# Patient Record
Sex: MALE | Race: WHITE | Employment: OTHER | URBAN - METROPOLITAN AREA
[De-identification: names, ages, dates, MRNs, and addresses within clinical notes are randomized per-mention and may not be internally consistent; named-entity substitution may affect disease eponyms.]

---

## 2017-02-20 PROBLEM — R79.89 LOW TESTOSTERONE LEVEL IN MALE: Status: ACTIVE | Noted: 2017-02-20

## 2017-02-20 PROCEDURE — 81003 URINALYSIS AUTO W/O SCOPE: CPT | Performed by: INTERNAL MEDICINE

## 2017-02-20 PROCEDURE — 84402 ASSAY OF FREE TESTOSTERONE: CPT | Performed by: INTERNAL MEDICINE

## 2017-02-20 PROCEDURE — 84403 ASSAY OF TOTAL TESTOSTERONE: CPT | Performed by: INTERNAL MEDICINE

## 2017-03-01 PROBLEM — Z85.850 HISTORY OF THYROID CANCER: Status: ACTIVE | Noted: 2017-03-01

## 2017-04-28 PROCEDURE — 86800 THYROGLOBULIN ANTIBODY: CPT | Performed by: INTERNAL MEDICINE

## 2017-04-28 PROCEDURE — 84432 ASSAY OF THYROGLOBULIN: CPT | Performed by: INTERNAL MEDICINE

## 2018-02-26 PROCEDURE — 86800 THYROGLOBULIN ANTIBODY: CPT | Performed by: INTERNAL MEDICINE

## 2018-02-26 PROCEDURE — 84432 ASSAY OF THYROGLOBULIN: CPT | Performed by: INTERNAL MEDICINE

## 2018-03-20 PROCEDURE — 84403 ASSAY OF TOTAL TESTOSTERONE: CPT | Performed by: INTERNAL MEDICINE

## 2018-03-20 PROCEDURE — 84402 ASSAY OF FREE TESTOSTERONE: CPT | Performed by: INTERNAL MEDICINE

## 2018-03-26 PROCEDURE — 81003 URINALYSIS AUTO W/O SCOPE: CPT | Performed by: INTERNAL MEDICINE

## 2018-03-30 PROBLEM — J84.10 PULMONARY FIBROSIS (HCC): Status: ACTIVE | Noted: 2018-03-30

## 2018-10-22 PROCEDURE — 84402 ASSAY OF FREE TESTOSTERONE: CPT | Performed by: INTERNAL MEDICINE

## 2018-10-22 PROCEDURE — 84403 ASSAY OF TOTAL TESTOSTERONE: CPT | Performed by: INTERNAL MEDICINE

## 2019-03-22 PROCEDURE — 81003 URINALYSIS AUTO W/O SCOPE: CPT | Performed by: INTERNAL MEDICINE

## 2019-03-28 PROBLEM — J84.10 PULMONARY FIBROSIS (HCC): Status: RESOLVED | Noted: 2018-03-30 | Resolved: 2019-03-28

## 2019-04-02 PROCEDURE — 86800 THYROGLOBULIN ANTIBODY: CPT | Performed by: INTERNAL MEDICINE

## 2019-04-02 PROCEDURE — 84432 ASSAY OF THYROGLOBULIN: CPT | Performed by: INTERNAL MEDICINE

## 2019-04-02 PROCEDURE — 36415 COLL VENOUS BLD VENIPUNCTURE: CPT | Performed by: INTERNAL MEDICINE

## 2019-04-10 PROCEDURE — 86800 THYROGLOBULIN ANTIBODY: CPT | Performed by: INTERNAL MEDICINE

## 2019-04-10 PROCEDURE — 84432 ASSAY OF THYROGLOBULIN: CPT | Performed by: INTERNAL MEDICINE

## 2020-09-11 PROBLEM — I50.21 ACUTE SYSTOLIC CONGESTIVE HEART FAILURE (HCC): Status: ACTIVE | Noted: 2020-09-11

## 2020-10-05 PROBLEM — R60.0 BILATERAL LEG EDEMA: Status: ACTIVE | Noted: 2020-10-05

## 2020-10-05 PROBLEM — I50.21 ACUTE SYSTOLIC CONGESTIVE HEART FAILURE (HCC): Status: RESOLVED | Noted: 2020-09-11 | Resolved: 2020-10-05

## 2020-10-05 PROBLEM — Z78.9 POOR CONDITIONING: Status: ACTIVE | Noted: 2020-10-05

## 2020-11-17 PROBLEM — R09.02 HYPOXIA: Status: ACTIVE | Noted: 2020-11-17

## 2020-11-17 PROBLEM — L03.032 PARONYCHIA OF GREAT TOE OF LEFT FOOT: Status: ACTIVE | Noted: 2020-11-17

## 2020-11-17 PROBLEM — U07.1 COVID-19 WITH MULTIPLE COMORBIDITIES: Status: ACTIVE | Noted: 2020-11-17

## 2021-04-01 PROBLEM — R09.02 HYPOXIA: Status: RESOLVED | Noted: 2020-11-17 | Resolved: 2021-04-01

## 2021-04-01 PROBLEM — L03.032 PARONYCHIA OF GREAT TOE OF LEFT FOOT: Status: RESOLVED | Noted: 2020-11-17 | Resolved: 2021-04-01

## 2021-04-13 PROBLEM — A49.9 UTI (URINARY TRACT INFECTION), BACTERIAL: Status: ACTIVE | Noted: 2021-04-13

## 2021-04-13 PROBLEM — N39.0 UTI (URINARY TRACT INFECTION), BACTERIAL: Status: ACTIVE | Noted: 2021-04-13

## 2021-05-04 ENCOUNTER — HOSPITAL ENCOUNTER (OUTPATIENT)
Facility: HOSPITAL | Age: 81
Setting detail: OBSERVATION
Discharge: HOME | End: 2021-05-06
Attending: INTERNAL MEDICINE | Admitting: INTERNAL MEDICINE
Payer: MEDICARE

## 2021-05-04 PROBLEM — J18.9 PNA (PNEUMONIA): Status: ACTIVE | Noted: 2021-05-04

## 2021-05-04 PROCEDURE — 84550 ASSAY OF BLOOD/URIC ACID: CPT | Performed by: INTERNAL MEDICINE

## 2021-05-04 RX ORDER — ASPIRIN 81 MG/1
81 TABLET ORAL DAILY
Status: DISCONTINUED | OUTPATIENT
Start: 2021-05-04 | End: 2021-05-06

## 2021-05-04 RX ORDER — TAMSULOSIN HYDROCHLORIDE 0.4 MG/1
0.4 CAPSULE ORAL DAILY
Status: DISCONTINUED | OUTPATIENT
Start: 2021-05-04 | End: 2021-05-06

## 2021-05-04 RX ORDER — METOCLOPRAMIDE HYDROCHLORIDE 5 MG/ML
10 INJECTION INTRAMUSCULAR; INTRAVENOUS EVERY 8 HOURS PRN
Status: DISCONTINUED | OUTPATIENT
Start: 2021-05-04 | End: 2021-05-06

## 2021-05-04 RX ORDER — LEVOFLOXACIN 5 MG/ML
750 INJECTION, SOLUTION INTRAVENOUS EVERY 24 HOURS
Status: DISCONTINUED | OUTPATIENT
Start: 2021-05-04 | End: 2021-05-05

## 2021-05-04 RX ORDER — ACETAMINOPHEN 325 MG/1
650 TABLET ORAL EVERY 6 HOURS PRN
Status: DISCONTINUED | OUTPATIENT
Start: 2021-05-04 | End: 2021-05-06

## 2021-05-04 RX ORDER — ALBUTEROL SULFATE 90 UG/1
2 AEROSOL, METERED RESPIRATORY (INHALATION) EVERY 6 HOURS PRN
Status: DISCONTINUED | OUTPATIENT
Start: 2021-05-04 | End: 2021-05-06

## 2021-05-04 RX ORDER — ONDANSETRON 2 MG/ML
4 INJECTION INTRAMUSCULAR; INTRAVENOUS EVERY 6 HOURS PRN
Status: DISCONTINUED | OUTPATIENT
Start: 2021-05-04 | End: 2021-05-06

## 2021-05-04 RX ORDER — LEVOTHYROXINE SODIUM 175 UG/1
175 TABLET ORAL
COMMUNITY

## 2021-05-04 RX ORDER — ASCORBIC ACID 500 MG
1000 TABLET ORAL DAILY
Status: DISCONTINUED | OUTPATIENT
Start: 2021-05-04 | End: 2021-05-06

## 2021-05-04 RX ORDER — FINASTERIDE 5 MG/1
5 TABLET, FILM COATED ORAL DAILY
Refills: 3 | Status: DISCONTINUED | OUTPATIENT
Start: 2021-05-04 | End: 2021-05-06

## 2021-05-04 RX ORDER — LEVOFLOXACIN 750 MG/1
750 TABLET ORAL DAILY
Status: DISCONTINUED | OUTPATIENT
Start: 2021-05-04 | End: 2021-05-04

## 2021-05-04 RX ORDER — ENOXAPARIN SODIUM 100 MG/ML
0.5 INJECTION SUBCUTANEOUS DAILY
Status: DISCONTINUED | OUTPATIENT
Start: 2021-05-04 | End: 2021-05-06

## 2021-05-04 RX ORDER — FLUTICASONE PROPIONATE 50 MCG
1 SPRAY, SUSPENSION (ML) NASAL DAILY
Status: DISCONTINUED | OUTPATIENT
Start: 2021-05-04 | End: 2021-05-06

## 2021-05-04 RX ORDER — CETIRIZINE HYDROCHLORIDE 10 MG/1
10 TABLET ORAL NIGHTLY
Status: DISCONTINUED | OUTPATIENT
Start: 2021-05-04 | End: 2021-05-06

## 2021-05-04 RX ORDER — ATORVASTATIN CALCIUM 20 MG/1
20 TABLET, FILM COATED ORAL NIGHTLY
Refills: 3 | Status: DISCONTINUED | OUTPATIENT
Start: 2021-05-04 | End: 2021-05-06

## 2021-05-05 PROCEDURE — 36415 COLL VENOUS BLD VENIPUNCTURE: CPT | Performed by: INTERNAL MEDICINE

## 2021-05-05 PROCEDURE — 85025 COMPLETE CBC W/AUTO DIFF WBC: CPT | Performed by: INTERNAL MEDICINE

## 2021-05-05 PROCEDURE — 87449 NOS EACH ORGANISM AG IA: CPT | Performed by: INTERNAL MEDICINE

## 2021-05-05 PROCEDURE — 80053 COMPREHEN METABOLIC PANEL: CPT | Performed by: INTERNAL MEDICINE

## 2021-05-05 PROCEDURE — 84145 PROCALCITONIN (PCT): CPT | Performed by: INTERNAL MEDICINE

## 2021-05-05 RX ORDER — LEVOFLOXACIN 750 MG/1
750 TABLET ORAL NIGHTLY
Status: DISCONTINUED | OUTPATIENT
Start: 2021-05-05 | End: 2021-05-06

## 2021-05-05 NOTE — PROGRESS NOTES
Problem: Pneumonia     Data: AOx 4. RA at 94% at rest. NSR on Lovenox. Uses urinal. No complaints of pain, nausea or vomiting. Pt updated on plan of care and verbalized understanding. Safety measures in place and call light return demonstrated.  No further

## 2021-05-05 NOTE — CONSULTS
[facility] Pharmacy Note: Route Optimization for Levofloxacin [facility])    Patient is currently on Levofloxacin (LEVAQUIN) 750 mg IV every 24 hours.    The patient meets the criteria to convert to the oral equivalent as established by the IV to Oral conversion pr

## 2021-05-05 NOTE — H&P
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 , Disp: 3 each, Rfl: 3  VENTOLIN  (90 Base) MCG/ACT Inhalation Aero Soln, Inhale 2 puffs into the lungs every 6 (six) hours as needed.  Do not subsitute, Disp: 1 Inhaler, Rfl: 5  Dutasterid PERRLA, OP clear, MMM  Pulm: +basilar crackles, normal respiratory effort  CV: Heart with regular rate and rhythm, no murmur. Normal PMI.     Abd: Abdomen soft, nontender, nondistended, no organomegaly, bowel sounds present  MSK: Full range of motion in ex noted.    IMPRESSION: 1. No evidence of acute fracture. CT ANGIOGRAPHY, CHEST (CPT=71275)    Result Date: 5/4/2021  DATE OF SERVICE: 05.04.2021 CT ANGIOGRAPHY, CHEST (CPT=71275) CLINICAL INDICATION: Shortness of breath.  COMPARISON STUDY: CT chest dated levaquin daily for now, consult pulm as pt may need bronch  - cont supp O2, wean as needed.     # Hx of severe COVID   - rapid COVID neg at [facility]  - some lung changes on CT may be related to severe COVID last year    # Bronchiectasis  - cont home inhalers   -

## 2021-05-05 NOTE — PROGRESS NOTES
NURSING ADMISSION NOTE      Patient admitted via Cart  Oriented to room. Safety precautions initiated. Bed in low position. Call light in reach. Assumed care of patient 0. Navigator, med rec complete. A/O x 4. VSS.  Maintaining O2 91-94% on manasa Progressing     Problem: SAFETY ADULT - FALL  Goal: Free from fall injury  Description: INTERVENTIONS:  - Assess pt frequently for physical needs  - Identify cognitive and physical deficits and behaviors that affect risk of falls.   - [place] fall precaut discharge date: tbd    Current discharge plan:     F/U appointment scheduled within 7 days. ..      [] [facility] ([facility])     [] Pulmonologist     [] Primary Care Physician     [] Other Specialty      [x] Care partner identified and updated with

## 2021-05-05 NOTE — PLAN OF CARE
Problem: Patient/Family Goals  Goal: Patient/Family Long Term Goal  Description: Patient's Long Term Goal: go home  Interventions:  - follow POC  - meds per [facility]  - See additional Care Plan goals for specific interventions  Outcome: Progressing  Goal: Arron Betancourt injury  - Provide assistive devices as appropriate  - Consider OT/PT consult to assist with strengthening/mobility  - Encourage toileting schedule  Outcome: Progressing     Problem: DISCHARGE PLANNING  Goal: Discharge to home or other facility with appropr

## 2021-05-05 NOTE — CONSULTS
[facility] PULMONARY/CRITICAL CARE CONSULTATION       HPI: Srinivasa Akhtar is a [de-identified]year old male with a history of HTN, hyperlipidemia, obesity, thyroid cancer, COVID (last year), asthma, bronchiectasis, and pseudomonas UTI who presented to the ER with left juan mouth.   Dutasteride 0.5 MG Oral Cap 5/4/2021 at 0900  No Yes   Sig: TAKE 1 CAPSULE EVERY DAY   Fluticasone Furoate-Vilanterol (BREO ELLIPTA) 100-25 MCG/INH Inhalation Aerosol Powder, Breath Activated 5/4/2021 at 0900  No Yes   Sig: Inhale 1 puff into the (BREO ELLIPTA) 100-25 MCG/INH inhaler 1 puff, 1 puff, Inhalation, Daily  Fluticasone Propionate (FLONASE) 50 MCG/ACT nasal spray 1 spray, 1 spray, Each Nare, Daily  Levothyroxine Sodium tab 175 mcg, 175 mcg, Oral, Before breakfast  atorvastatin (LIPITOR) t Connections:       Frequency of Communication with Friends and Family:       Frequency of Social Gatherings with Friends and Family:       Attends Religion Services:       Active Member of Clubs or Organizations:       Attends Club or Organization Meeting secondary to possible pneumonia, however some of his complaints seem chronic and it's possible they are due to effects of COVID last year. CTA was negative for PE but shows nonspecific bilateral groundglass and reticular abnormalities.  His CXR's have shown

## 2021-05-06 VITALS
HEART RATE: 72 BPM | RESPIRATION RATE: 20 BRPM | WEIGHT: 274.94 LBS | TEMPERATURE: 100 F | BODY MASS INDEX: 41.67 KG/M2 | HEIGHT: 68 IN | DIASTOLIC BLOOD PRESSURE: 76 MMHG | OXYGEN SATURATION: 92 % | SYSTOLIC BLOOD PRESSURE: 134 MMHG

## 2021-05-06 PROCEDURE — 97162 PT EVAL MOD COMPLEX 30 MIN: CPT

## 2021-05-06 PROCEDURE — 85025 COMPLETE CBC W/AUTO DIFF WBC: CPT | Performed by: INTERNAL MEDICINE

## 2021-05-06 PROCEDURE — 80048 BASIC METABOLIC PNL TOTAL CA: CPT | Performed by: INTERNAL MEDICINE

## 2021-05-06 PROCEDURE — 97530 THERAPEUTIC ACTIVITIES: CPT

## 2021-05-06 NOTE — PROGRESS NOTES
AOX4, RA while awake, requires 2L NC at [facility]. VSS, denies pain. Tele NSR, PPPx4, no edema noted, lovenox. Voiding freely, denies dysuria, NVD. PO ABX updated pt and wife of POC for NOC, all questions answered. Call button and safety precautions in place.  Wi

## 2021-05-06 NOTE — CM/SW NOTE
05/06/21 1000   CM/SW Screening   Referral Source Social Work (self-referral)   Information Source Chart review;Nursing rounds   Patient was screened during rounds and no needs are identified at this time. RN to contact SW/CM if needs arise.       Andre Flatten

## 2021-05-06 NOTE — PLAN OF CARE
Problem: Patient/Family Goals  Goal: Patient/Family Long Term Goal  Description: Patient's Long Term Goal: go home  Interventions:  - follow POC  - meds per [facility]  - See additional Care Plan goals for specific interventions  Outcome: Progressing  Goal: [place] Arrange for interpreters to assist at discharge as needed  - Consider post-discharge preferences of patient/family/discharge partner  - Complete POLST form as appropriate  - Assess patient's ability to be responsible for managing their own health  - Refer

## 2021-05-06 NOTE — PHYSICAL THERAPY NOTE
PHYSICAL THERAPY EVALUATION - INPATIENT     Room Number: 525/525-A  Evaluation Date: 5/6/2021  Type of Evaluation: Initial  Physician Order: PT Eval and Treat    Presenting Problem: dyspnea, possible PNA  Reason for Therapy: Mobility Dysfunction and extremity ROM and strength are within functional limits     Lower extremity ROM is within functional limits     Lower extremity strength is within functional limits     BALANCE  Static Sitting: Fair +  Dynamic Sitting: Fair  Static Standing: Fair  Dynamic for pacing and safety. Chair alarm in place. Updated pt on role of PT, POC, DC planning/recs, positioning.,activity.        Exercise/Education Provided:  Bed mobility  Energy conservation  Functional activity tolerated  Transfer training    Patient End of S level: supervision     Goal #3 Patient is able to ambulate 150 feet with assist device: least  restrictive device at assistance level: supervision     Goal #4 Assess stairs as safe and appropriate.     Goal #5    Goal #6    Goal Comments: Goals established

## 2021-05-06 NOTE — PROGRESS NOTES
NURSING DISCHARGE NOTE    Discharged Home via Wheelchair. Accompanied by Support staff  Belongings Taken by patient/family. Discharge education provided and all questions answered. Patient belongings taken with patient at time of discharge.   PIV rem

## 2021-05-06 NOTE — PROGRESS NOTES
[facility] PULMONARY/CRITICAL CARE    S: Pt is feeling ok. He's not coughing too much.      Meds:  • levofloxacin  750 mg Oral Nightly   • vitamin C  1,000 mg Oral Daily   • aspirin  81 mg Oral Daily   • cetirizine  10 mg Oral Nightly   • finasteride  5 mg Oral Da nonspecific bilateral groundglass and reticular abnormalities. His CXR's have shown abnormalities for months.  Acute pneumonia seems less likely  -wean O2 as able -- will order home O2 evaluation to see if he'd benefit from ambulatory supplemental oxygen

## 2021-05-06 NOTE — PAYOR COMM NOTE
--------------  ADMISSION REVIEW     PayorLanny Matthew MA [facility]  Subscriber #:  [id number]  Authorization Number: [id number]         H&P - H&P Note        [facility] Hospitalist History and Physical      Chief complaint:  Shortness of breath     PCP: Fabio Winkler MD neurologic deficits  HEENT:  EOMI, PERRLA, OP clear, MMM  Pulm: +basilar crackles, normal respiratory effort  CV: Heart with regular rate and rhythm, no murmur. Normal PMI.     Abd: Abdomen soft, nontender, nondistended, no organomegaly, bowel sounds prese Calcaneal bone spurs are noted. IMPRESSION: 1. No evidence of acute fracture. CT ANGIOGRAPHY, CHEST (CPT=71275)    Result Date: 5/4/2021  DATE OF SERVICE: 05.04.2021 CT ANGIOGRAPHY, CHEST (CPT=71275) CLINICAL INDICATION: Shortness of breath.  COMP vs ILD  - will cover with levaquin daily for now, consult pulm as pt may need bronch  - cont supp O2, wean as needed.     # Hx of severe COVID   - rapid COVID neg at [facility]  - some lung changes on CT may be related to severe COVID last year    # Bronchiectasis GLU 95 100*   CA  --  8.3*   ALB 3.7 2.9*   ALKPHO 113 101   AST 15 16   ALT 14 21   BILT 0.63 0.9   TP 6.2* 6.3*           05/06/21 0402 98.1 °F (36.7 °C) 72 20 124/77 97 % — None (Room air) 2 L/min         05/05/21 0330 — — — — 88 %Abnormal  — Nasal ca Action Dose Route User    5/6/2021 0523 Given 175 mcg Oral Maame Cornell RN          REVIEWER COMMENTS:     OTHER:

## 2021-05-06 NOTE — PROGRESS NOTES
[facility] Hospitalist Progress Note                                                                   [address] [facility]  11/18/1940    SUBJECTIVE:  Pt seen and examined.   Overall feels breathing i Infusions:   PRN: Albuterol Sulfate HFA, acetaminophen, ondansetron HCl, Metoclopramide HCl    Assessment/Plan:  Active Problems:    PNA (pneumonia)       [de-identified] yr old male with PMH sig for severe COVID in 2020 requiring intubation and LTACH stay, recurrent p

## 2021-05-07 NOTE — PAYOR COMM NOTE
Discharge Notification    Patient Name: Ivie JeansSamie Fees MA [place]  Subscriber #: [id number]  Authorization Number: [id number]  Admit Date/Time: 5/4/2021 7:44 PM  Discharge Date/Time: 5/6/2021 3:49 PM

## 2021-05-07 NOTE — PAYOR COMM NOTE
--------------  CONTINUED STAY REVIEW    Mario Roman MA O  Subscriber #:  [id number]  Authorization Number: [id number]        5/6 HOSP    # Shortness of breath  # Hypoxia   # Abnormal CT chest  - d/w Dr. Sophy Slaughter of [facility], she is concerned about atypica albuterol  5. Dispo - full code  -ok for discharge from a pulmonary standpoint. He can follow up in 2 weeks.  He should have pulmonary function testing done as an outpatient      Recent Labs   Lab 05/04/21  1327 05/05/21  0632 05/06/21  0639   WBC 8.87 7.4

## 2021-05-13 PROBLEM — U09.9 CHRONIC POST-COVID-19 SYNDROME: Status: ACTIVE | Noted: 2021-05-13

## 2021-05-13 PROBLEM — B37.9 YEAST INFECTION: Status: ACTIVE | Noted: 2021-05-13

## 2021-07-28 PROBLEM — D69.6 PLATELETS DECREASED (HCC): Status: ACTIVE | Noted: 2021-07-28
